# Patient Record
Sex: MALE | Race: BLACK OR AFRICAN AMERICAN | NOT HISPANIC OR LATINO | URBAN - METROPOLITAN AREA
[De-identification: names, ages, dates, MRNs, and addresses within clinical notes are randomized per-mention and may not be internally consistent; named-entity substitution may affect disease eponyms.]

---

## 2020-11-11 ENCOUNTER — OFFICE VISIT (OUTPATIENT)
Dept: URBAN - METROPOLITAN AREA CLINIC 40 | Facility: CLINIC | Age: 55
End: 2020-11-11
Payer: COMMERCIAL

## 2020-11-11 ENCOUNTER — WEB ENCOUNTER (OUTPATIENT)
Dept: URBAN - METROPOLITAN AREA CLINIC 40 | Facility: CLINIC | Age: 55
End: 2020-11-11

## 2020-11-11 DIAGNOSIS — R91.8 PULMONARY NODULES: ICD-10-CM

## 2020-11-11 DIAGNOSIS — Z72.0 TOBACCO USE: ICD-10-CM

## 2020-11-11 DIAGNOSIS — Z86.010 PERSONAL HISTORY OF COLON POLYPS: ICD-10-CM

## 2020-11-11 DIAGNOSIS — Z85.038 PERSONAL HISTORY OF COLON CANCER: ICD-10-CM

## 2020-11-11 PROBLEM — 429699009 HISTORY OF MALIGNANT NEOPLASM OF COLON: Status: ACTIVE | Noted: 2020-11-11

## 2020-11-11 PROBLEM — 428283002 HISTORY OF POLYP OF COLON: Status: ACTIVE | Noted: 2020-11-11

## 2020-11-11 PROCEDURE — G8427 DOCREV CUR MEDS BY ELIG CLIN: HCPCS | Performed by: PHYSICIAN ASSISTANT

## 2020-11-11 PROCEDURE — G9906 PT RECV TBCO CESS INTERV: HCPCS | Performed by: PHYSICIAN ASSISTANT

## 2020-11-11 PROCEDURE — 3017F COLORECTAL CA SCREEN DOC REV: CPT | Performed by: PHYSICIAN ASSISTANT

## 2020-11-11 PROCEDURE — 99214 OFFICE O/P EST MOD 30 MIN: CPT | Performed by: PHYSICIAN ASSISTANT

## 2020-11-11 PROCEDURE — G9902 PT SCRN TBCO AND ID AS USER: HCPCS | Performed by: PHYSICIAN ASSISTANT

## 2020-11-11 RX ORDER — DUTASTERIDE 0.5 MG/1
CAPSULE, LIQUID FILLED ORAL
Qty: 0 | Refills: 0 | Status: ACTIVE | COMMUNITY
Start: 1900-01-01 | End: 1900-01-01

## 2020-11-11 NOTE — HPI-TODAY'S VISIT:
Mr Carrasquillo is a 55 year old black male who returns to the office for follow up and to schedule surveillance colonoscopy. Last seen in office 7/9/19. History of colon polyps and colon cancer. He underwent a sigmoid colectomy by Dr. Grace in 2015, and pathology revealed a completely resected adenocarcinoma.  In 2015, his staging ct scan showed small pulmonary nodules, and Dr. Grace recommended a f/u chest ct scan, which patient never completed until last year. He continues smoking 1/2 ppd of cigarettes as he has done for many years. Denies history of COPD or evaluation by a pulmonologist. Findings of CT Chest w/ multiple pulmonary nodules. Completed a colonoscopy on 2/11/19, which showed a normal sigmoid anastomosis, and small internal hemorrhoids.he is feeling well, with no further rectal bleeding, and normal bowel movements. He has no GI complaints today.  Remarks of alternating bowel habits without rectal bleeding.  He does continue smoking, planning to quit soon.  Has not followed up recently for evaluation of pulmonary nodules as he is working out of [place], [place].  He is unsure as to whether he will return to the [facility] to live.  Requesting colonoscopy be completed later this week.  He has no preference as far as endoscopist.  Would like to try of complete his colonoscopy prior to returning to [place], [place] on Sunday.

## 2020-11-11 NOTE — PHYSICAL EXAM NEUROLOGIC:
nonfocal , oriented to person, place and time , normal sensation , short and long term memory intact

## 2020-11-13 ENCOUNTER — OFFICE VISIT (OUTPATIENT)
Dept: URBAN - METROPOLITAN AREA SURGERY CENTER 30 | Facility: SURGERY CENTER | Age: 55
End: 2020-11-13
Payer: COMMERCIAL

## 2020-11-13 DIAGNOSIS — Z85.038 H/O COLON CANCER, STAGE I: ICD-10-CM

## 2020-11-13 PROCEDURE — G8907 PT DOC NO EVENTS ON DISCHARG: HCPCS | Performed by: INTERNAL MEDICINE

## 2020-11-13 PROCEDURE — G9936 PMH PLYP/NEO CO/RECT/JUN/ANS: HCPCS | Performed by: INTERNAL MEDICINE

## 2020-11-13 PROCEDURE — G0105 COLORECTAL SCRN; HI RISK IND: HCPCS | Performed by: INTERNAL MEDICINE

## 2020-11-23 ENCOUNTER — DASHBOARD ENCOUNTERS (OUTPATIENT)
Age: 55
End: 2020-11-23

## 2020-11-23 ENCOUNTER — TELEPHONE ENCOUNTER (OUTPATIENT)
Dept: URBAN - METROPOLITAN AREA CLINIC 40 | Facility: CLINIC | Age: 55
End: 2020-11-23

## 2020-11-23 ENCOUNTER — OFFICE VISIT (OUTPATIENT)
Dept: URBAN - METROPOLITAN AREA TELEHEALTH 2 | Facility: TELEHEALTH | Age: 55
End: 2020-11-23

## 2020-11-23 RX ORDER — DUTASTERIDE 0.5 MG/1
CAPSULE, LIQUID FILLED ORAL
Qty: 0 | Refills: 0 | Status: ACTIVE | COMMUNITY
Start: 1900-01-01 | End: 1900-01-01

## 2020-11-23 NOTE — HPI-TODAY'S VISIT:
Mr Carrasquillo is a 55 year old black male who returns to the office for follow up and to schedule surveillance colonoscopy. Last seen in office 7/9/19. History of colon polyps and colon cancer. He underwent a sigmoid colectomy by Dr. Grace in 2015, and pathology revealed a completely resected adenocarcinoma.  In 2015, his staging ct scan showed small pulmonary nodules, and Dr. Grace recommended a f/u chest ct scan, which patient never completed until last year. He continues smoking 1/2 ppd of cigarettes as he has done for many years. Denies history of COPD or evaluation by a pulmonologist. Findings of CT Chest w/ multiple pulmonary nodules. Completed a colonoscopy on 2/11/19, which showed a normal sigmoid anastomosis, and small internal hemorrhoids.he is feeling well, with no further rectal bleeding, and normal bowel movements. He has no GI complaints today.  Remarks of alternating bowel habits without rectal bleeding.  He does continue smoking, planning to quit soon.  Has not followed up recently for evaluation of pulmonary nodules as he is working out of [place], [place].  He is unsure as to whether he will return to the [facility] to live.  Requesting colonoscopy be completed later this week.  He has no preference as far as endoscopist.  Would like to try of complete his colonoscopy prior to returning to [place], [place] on Sunday. He did have colonoscopy by Dr. Sheth on November 13, 2020 at which time he was noted to have a normal-appearing and patent end-to-end colocolonic anastomosis in the rectosigmoid colon.  Few diverticula of the sigmoid colon.  Normal anal rectal exam.  Digital rectal exam normal.  No evidence of neoplasia.  It was recommended he have surveillance in 3 years time.